# Patient Record
(demographics unavailable — no encounter records)

---
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
## 2025-03-26 NOTE — PHYSICAL EXAM
[Normal] : the outer ears and nose were normal in appearance and the oropharynx was normal [de-identified] : Overweight

## 2025-03-26 NOTE — HISTORY OF PRESENT ILLNESS
[FreeTextEntry1] : Patient is here to establish relationship with a primary care physician and she has multiple concerns.   [de-identified] : Patient's main concern is weight.  Patient is overweight and she states that her weight fluctuates.  She has been unable to lose weight.  She has never seen a nutritionist.  Patient feels depressed mainly about her weight.  There are no thoughts of self-harm. Patient is  without children and she and her  own a  on [address] in [facility]. Patient has a history of an abnormal Pap smear and had a cone biopsy in the past.  She has not seen the gynecologist however for about 3 years. Patient has a peanut allergy that results in anaphylaxis.  She is not currently carrying an EpiPen however. Patient had a large ganglion cyst removed from her left wrist several years ago and is still has scarring and a small remnant of a cyst. Patient has a history of hypertension since her teenage years and takes amlodipine 2.5 mg daily.  She states that her blood pressures at home typically run in the 150 range. Patient is has a rash beneath both arms which is pruritic. Patient has had a biopsy of a breast mass in the past which was benign and she is requesting a follow-up with a GYN physician. Patient has a minimal smoking history and stopped in 2012.  Patient has a history of migraines and takes a triptan along with Aleve. Patient states that when she cuts herself she bleeds excessively and has easy bruisability.

## 2025-04-28 NOTE — PHYSICAL EXAM
[Normal] : normal rate, regular rhythm, normal S1 and S2 and no murmur heard [de-identified] : Tenderness over the extensor carpi radialis longus muscle.

## 2025-04-28 NOTE — HISTORY OF PRESENT ILLNESS
[FreeTextEntry1] : Patient is here for follow-up of hypertension, migraines, obesity and depression. [de-identified] : Patient discontinued amlodipine and is just taking metoprolol 25 mg twice daily.  She states her headaches are much improved.  Her blood pressures at home are in the 150 systolic range. Patient states she feels about the same regarding depression.  There is no thoughts of self-harm.  She has attempted to reach her psychologist and she is waiting for a call back. In terms of obesity, patient states she has reduced her sugar and carbohydrate intake but she has not lost weight.  She has a pending appointment with the nutritionist. Patient is also complaining of a cough that is lingering since her recent upper respiratory tract infection 2 weeks ago.  The cough is nonproductive, mostly at night, not associated with dyspnea, coryza or postnasal drip. Patient states she lifted something heavy at work and developed pain in her right elbow.

## 2025-05-19 NOTE — HEALTH RISK ASSESSMENT
[1/2 of Days or More (2)] : 1.) Little interest or pleasure in doing things? Half the days or more [Several Days (1)] : 6.) Feeling bad about yourself, or that you are a failure, or have let yourself or your family down? Several days [Nearly Every Day (3)] : 7.) Trouble concentrating on things, such as reading a newspaper or watching television? Nearly every day [Not at All (0)] : 9.) Thoughts that you would be off dead or of hurting yourself in some way? Not at all [Moderate] : Severity of Depression is Moderate [[id number]] : 14

## 2025-05-19 NOTE — HISTORY OF PRESENT ILLNESS
[FreeTextEntry1] : Patient is here for follow-up of blood pressure [de-identified] : Patient states she could not tolerate metoprolol twice a day.  Caused significant fatigue.  Her headaches have recurred but now she describes them as from the back of the neck to the frontal area associated with a significant amount of scalp tenderness. Patient states that she is under tremendous amount of stress due to her ownership of a .  She states she does not sleep well at night and feels anxious and depressed. Patient also reports increasing dandruff and hair loss and would like a referral to a dermatologist.

## 2025-06-04 NOTE — HISTORY OF PRESENT ILLNESS
[FreeTextEntry1] : Patient is here for follow-up of multiple complaints which include migraines, hypertension, facial swelling and history of cardiac arrhythmia [de-identified] : Patient reduce the dose of metoprolol because 100 mg a day caused fatigue.  She is now taking 50 mg, 1/2 tablet twice daily. Patient has noted increase number of headaches and they wake her from sleep.  This happens 3-4 times each week.  They respond to rizatriptan.  She describes the headache is pounding around her right eye.  She has had migraine headaches since teenage years but states that her migraine frequency has increased significantly. She is taking sertraline for depression and states she is starting to feel better. Patient states that when she eats certain foods she develops facial swelling which is transient. Blood pressure has generally been better roughly 140/90 at home.

## 2025-06-04 NOTE — PHYSICAL EXAM
[TextEntry] : Constitutional: Patient is well nourished, well appearing and in no distress There is no evidence of facial swelling. Pulmonary: No respiratory distress Neuro: Speech normal, alert and oriented Psychiatric: Normal mood, normal insight/judgement, normal affect

## 2025-06-04 NOTE — HISTORY OF PRESENT ILLNESS
[FreeTextEntry1] : Patient is here for follow-up of multiple complaints which include migraines, hypertension, facial swelling and history of cardiac arrhythmia [de-identified] : Patient reduce the dose of metoprolol because 100 mg a day caused fatigue.  She is now taking 50 mg, 1/2 tablet twice daily. Patient has noted increase number of headaches and they wake her from sleep.  This happens 3-4 times each week.  They respond to rizatriptan.  She describes the headache is pounding around her right eye.  She has had migraine headaches since teenage years but states that her migraine frequency has increased significantly. She is taking sertraline for depression and states she is starting to feel better. Patient states that when she eats certain foods she develops facial swelling which is transient. Blood pressure has generally been better roughly 140/90 at home.